# Patient Record
Sex: FEMALE | Race: ASIAN | NOT HISPANIC OR LATINO
[De-identification: names, ages, dates, MRNs, and addresses within clinical notes are randomized per-mention and may not be internally consistent; named-entity substitution may affect disease eponyms.]

---

## 2022-05-31 PROBLEM — Z00.00 ENCOUNTER FOR PREVENTIVE HEALTH EXAMINATION: Status: ACTIVE | Noted: 2022-05-31

## 2022-06-09 ENCOUNTER — APPOINTMENT (OUTPATIENT)
Dept: THORACIC SURGERY | Facility: CLINIC | Age: 72
End: 2022-06-09
Payer: MEDICARE

## 2022-06-09 VITALS
HEIGHT: 65 IN | TEMPERATURE: 97.2 F | RESPIRATION RATE: 16 BRPM | SYSTOLIC BLOOD PRESSURE: 127 MMHG | DIASTOLIC BLOOD PRESSURE: 70 MMHG | BODY MASS INDEX: 20.99 KG/M2 | HEART RATE: 84 BPM | OXYGEN SATURATION: 95 % | WEIGHT: 126 LBS

## 2022-06-09 DIAGNOSIS — Z86.39 PERSONAL HISTORY OF OTHER ENDOCRINE, NUTRITIONAL AND METABOLIC DISEASE: ICD-10-CM

## 2022-06-09 PROCEDURE — 99205 OFFICE O/P NEW HI 60 MIN: CPT

## 2022-06-10 PROBLEM — Z86.39 HISTORY OF HYPERLIPIDEMIA: Status: RESOLVED | Noted: 2022-06-10 | Resolved: 2022-06-10

## 2022-06-10 RX ORDER — ATORVASTATIN CALCIUM 80 MG/1
TABLET, FILM COATED ORAL
Refills: 0 | Status: ACTIVE | COMMUNITY

## 2022-06-10 NOTE — PHYSICAL EXAM
[Fully active, able to carry on all pre-disease performance without restriction] : Status 0 - Fully active, able to carry on all pre-disease performance without restriction [General Appearance - Alert] : alert [General Appearance - In No Acute Distress] : in no acute distress [Sclera] : the sclera and conjunctiva were normal [PERRL With Normal Accommodation] : pupils were equal in size, round, and reactive to light [Extraocular Movements] : extraocular movements were intact [Outer Ear] : the ears and nose were normal in appearance [Oropharynx] : the oropharynx was normal [Neck Appearance] : the appearance of the neck was normal [Neck Cervical Mass (___cm)] : no neck mass was observed [Jugular Venous Distention Increased] : there was no jugular-venous distention [Thyroid Diffuse Enlargement] : the thyroid was not enlarged [Thyroid Nodule] : there were no palpable thyroid nodules [Auscultation Breath Sounds / Voice Sounds] : lungs were clear to auscultation bilaterally [Heart Rate And Rhythm] : heart rate was normal and rhythm regular [Heart Sounds Gallop] : no gallops [Heart Sounds] : normal S1 and S2 [Murmurs] : no murmurs [Heart Sounds Pericardial Friction Rub] : no pericardial rub [Examination Of The Chest] : the chest was normal in appearance [Chest Visual Inspection Thoracic Asymmetry] : no chest asymmetry [Diminished Respiratory Excursion] : normal chest expansion [2+] : left 2+ [Breast Appearance] : normal in appearance [Breast Palpation Mass] : no palpable masses [Bowel Sounds] : normal bowel sounds [Abdomen Soft] : soft [Abdomen Tenderness] : non-tender [Abdomen Mass (___ Cm)] : no abdominal mass palpated [Cervical Lymph Nodes Enlarged Posterior Bilaterally] : posterior cervical [Cervical Lymph Nodes Enlarged Anterior Bilaterally] : anterior cervical [Supraclavicular Lymph Nodes Enlarged Bilaterally] : supraclavicular [No CVA Tenderness] : no ~M costovertebral angle tenderness [No Spinal Tenderness] : no spinal tenderness [Abnormal Walk] : normal gait [Nail Clubbing] : no clubbing  or cyanosis of the fingernails [Musculoskeletal - Swelling] : no joint swelling seen [Motor Tone] : muscle strength and tone were normal [Skin Color & Pigmentation] : normal skin color and pigmentation [Skin Turgor] : normal skin turgor [] : no rash [Deep Tendon Reflexes (DTR)] : deep tendon reflexes were 2+ and symmetric [Sensation] : the sensory exam was normal to light touch and pinprick [No Focal Deficits] : no focal deficits [Impaired Insight] : insight and judgment were intact [Oriented To Time, Place, And Person] : oriented to person, place, and time [Affect] : the affect was normal

## 2022-06-12 NOTE — CONSULT LETTER
[Dear  ___] : Dear  [unfilled], [Consult Letter:] : I had the pleasure of evaluating your patient, [unfilled]. [( Thank you for referring [unfilled] for consultation for _____ )] : Thank you for referring [unfilled] for consultation for [unfilled] [Please see my note below.] : Please see my note below. [Consult Closing:] : Thank you very much for allowing me to participate in the care of this patient.  If you have any questions, please do not hesitate to contact me. [Sincerely,] : Sincerely, [DrKathleen  ___] : Dr. PADILLA [FreeTextEntry2] : Dr. Nae Camargo (ID/Referring)\par Dr. Julien Michel (PCP) [FreeTextEntry3] : Constantino Chavarria MD, MPH \par System Director of Thoracic Surgery \par Director of Comprehensive Lung and Foregut [place] \par Professor Cardiovascular & Thoracic Surgery  \par [facility]\par \par [facility]\par [address]\par Oncology [address]\[place] [address]\par Tel: [phone]\par Fax: [phone]\par

## 2022-06-12 NOTE — ASSESSMENT
[FreeTextEntry1] : Ms. MICHELLE ELI, 71 year old female, never smoker, w/ hx of HLD, chronic lung nodules, referred by ID Dr. 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 CT scan reviewed, I recommended a PET/CT for further evaluation\par 2. PFTs with Dr. Justus Otto\par 3. RTC after all of above to discuss next step.\par \par \par I personally performed the services described in the documentation, reviewed the documentation recorded by the scribe in my presence and it accurately and completely records my words and actions.\par \par I, Zoila Leong NP, am scribing for and the presence of ROSALINDA Whitlock, the following sections HISTORY OF PRESENT ILLNESS, PAST MEDICAL/FAMILY/SOCIAL HISTORY; REVIEW OF SYSTEMS; VITAL SIGNS; PHYSICAL EXAM; DISPOSITION.\par \par

## 2022-06-30 ENCOUNTER — APPOINTMENT (OUTPATIENT)
Dept: THORACIC SURGERY | Facility: CLINIC | Age: 72
End: 2022-06-30

## 2022-06-30 VITALS
RESPIRATION RATE: 18 BRPM | SYSTOLIC BLOOD PRESSURE: 144 MMHG | BODY MASS INDEX: 20.63 KG/M2 | WEIGHT: 124 LBS | OXYGEN SATURATION: 95 % | DIASTOLIC BLOOD PRESSURE: 76 MMHG | HEART RATE: 94 BPM | TEMPERATURE: 98.2 F

## 2022-06-30 PROCEDURE — 99214 OFFICE O/P EST MOD 30 MIN: CPT

## 2022-07-01 RX ORDER — OXYBUTYNIN CHLORIDE 5 MG/1
5 TABLET ORAL
Qty: 30 | Refills: 0 | Status: ACTIVE | COMMUNITY
Start: 2022-05-27

## 2022-07-01 RX ORDER — DENOSUMAB 60 MG/ML
60 INJECTION SUBCUTANEOUS
Qty: 1 | Refills: 0 | Status: ACTIVE | COMMUNITY
Start: 2022-05-31

## 2022-07-01 RX ORDER — MUPIROCIN 2 G/100G
2 CREAM TOPICAL
Qty: 30 | Refills: 0 | Status: ACTIVE | COMMUNITY
Start: 2022-02-22

## 2022-07-01 RX ORDER — AZELASTINE HYDROCHLORIDE 0.5 MG/ML
0.05 SOLUTION/ DROPS OPHTHALMIC
Qty: 6 | Refills: 0 | Status: ACTIVE | COMMUNITY
Start: 2022-05-27

## 2022-07-01 RX ORDER — DICLOFENAC EPOLAMINE 0.01 G/1
1.3 SYSTEM TOPICAL
Qty: 60 | Refills: 0 | Status: ACTIVE | COMMUNITY
Start: 2022-05-27

## 2022-07-01 RX ORDER — LOPERAMIDE HYDROCHLORIDE 2 MG/1
2 CAPSULE ORAL
Qty: 20 | Refills: 0 | Status: ACTIVE | COMMUNITY
Start: 2022-06-25

## 2022-07-01 RX ORDER — TRAZODONE HYDROCHLORIDE 50 MG/1
50 TABLET ORAL
Qty: 30 | Refills: 0 | Status: ACTIVE | COMMUNITY
Start: 2022-05-27

## 2022-07-01 RX ORDER — OMEGA-3-ACID ETHYL ESTERS CAPSULES 1 G/1
1 CAPSULE, LIQUID FILLED ORAL
Qty: 120 | Refills: 0 | Status: ACTIVE | COMMUNITY
Start: 2022-02-22

## 2022-07-03 NOTE — CONSULT LETTER
[Dear  ___] : Dear  [unfilled], [Consult Letter:] : I had the pleasure of evaluating your patient, [unfilled]. [( Thank you for referring [unfilled] for consultation for _____ )] : Thank you for referring [unfilled] for consultation for [unfilled] [Please see my note below.] : Please see my note below. [Consult Closing:] : Thank you very much for allowing me to participate in the care of this patient.  If you have any questions, please do not hesitate to contact me. [Sincerely,] : Sincerely, [DrKathleen  ___] : Dr. PADILLA [FreeTextEntry2] : Dr. Nae Camargo (ID/Referring)\par Dr. Julien Michel (PCP) [FreeTextEntry3] : Constantino Chavarria MD, MPH \par System Director of Thoracic Surgery \par Director of Comprehensive Lung and Foregut [place] \par Professor Cardiovascular & Thoracic Surgery  \par [facility]\par \par [facility]\par [address]\par Oncology [address]\[place] [address]\par Tel: [phone]\par Fax: [phone]\par

## 2022-07-03 NOTE — DATA REVIEWED
[FreeTextEntry1] : I have independently reviewed the following:\par PET/CT on 6/23/22\par PFTs on 6/27/22

## 2022-07-03 NOTE — HISTORY OF PRESENT ILLNESS
[FreeTextEntry1] : Ms. MICHELLE ELI, 71 year old female, never smoker, w/ hx of HLD, chronic lung nodules (followed by Dr. Petar Rodriguez), who presented to PCP for routine f/u, she reported that she stopped seeing Dr. Rodriguez, so PCP had ordered her annual CT chest. Patient was referred to ID Dr. Nae Camargo to r/o T.B. 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.\par \par PET/CT on 6/23/22:\par - active Lt cervical chain LNs measuring up to SUV max 4.4\par - active Lt supraclavicular LNs measuring up to SUV max 6.2\par - 1cm SUV=2.6 RUL nodule (image 44)\par - 1.1cm SUV=3.7 RUL nodule (image 50)\par - foci of groundglass airspace disease throughout both lungs measuring up to SUV max 8.3 in the RLL (images 44-65)\par - foci of active subpleural airspace disease in the middle lobe and lingula anterior (image 66) measuring up to SUV max 4.7\par - subpleural airspace disease at both lung bases measuring up to SUV max 4.6\par \par PFTs on 6/27/22: FVC 66%, FEV1 67%.\par  \par Patient is here today for a follow up. Admits to dry cough, sometimes w/ blood-tinged sputum.\par \par

## 2022-07-03 NOTE — ASSESSMENT
[FreeTextEntry1] : Ms. MICHELLE ELI, 71 year old female, never smoker, w/ hx of HLD, chronic lung nodules, referred by ID Dr. 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.\par \par PET/CT on 6/23/22:\par - active Lt cervical chain LNs measuring up to SUV max 4.4\par - active Lt supraclavicular LNs measuring up to SUV max 6.2\par - 1cm SUV=2.6 RUL nodule (image 44)\par - 1.1cm SUV=3.7 RUL nodule (image 50)\par - foci of groundglass airspace disease throughout both lungs measuring up to SUV max 8.3 in the RLL (images 44-65)\par - foci of active subpleural airspace disease in the middle lobe and lingula anterior (image 66) measuring up to SUV max 4.7\par - subpleural airspace disease at both lung bases measuring up to SUV max 4.6\par \par PFTs on 6/27/22: FVC 66%, FEV1 67%.\par \par I have reviewed the patient's medical records and diagnostic images at time of this office consultation and have made the following recommendation:\par 1. PET scan reviewed, findings highly suspicious for malignancy, I recommended surgical resection (risks and benefits and alternatives: FNA of Lt supraclavicular LN, explained to patient, all questions answered) but patient declined as of now, she prefers to repeat another CT Chest w/o contrast in 6 months time.\par 2. RTC in 6mo with CT scan.\par \par \par I personally performed the services described in the documentation, reviewed the documentation recorded by the scribe in my presence and it accurately and completely records my words and actions.\par \par I, Zoila Leong NP, am scribing for and the presence of ROSALINDA Whitlock, the following sections HISTORY OF PRESENT ILLNESS, PAST MEDICAL/FAMILY/SOCIAL HISTORY; REVIEW OF SYSTEMS; VITAL SIGNS; PHYSICAL EXAM; DISPOSITION.\par \par

## 2022-12-22 ENCOUNTER — APPOINTMENT (OUTPATIENT)
Dept: THORACIC SURGERY | Facility: CLINIC | Age: 72
End: 2022-12-22

## 2022-12-22 VITALS
SYSTOLIC BLOOD PRESSURE: 132 MMHG | TEMPERATURE: 97.8 F | RESPIRATION RATE: 18 BRPM | DIASTOLIC BLOOD PRESSURE: 74 MMHG | WEIGHT: 121 LBS | HEART RATE: 88 BPM | OXYGEN SATURATION: 98 % | BODY MASS INDEX: 20.14 KG/M2

## 2022-12-22 PROCEDURE — 99214 OFFICE O/P EST MOD 30 MIN: CPT

## 2022-12-23 NOTE — ASSESSMENT
[FreeTextEntry1] : Ms. MICHELLE ELI, 72 year old female, never smoker, w/ hx of HLD, chronic lung nodules (followed by Dr. Petar Rodriguez), who presented to PCP for routine f/u, she reported that she stopped seeing Dr. Rodriguez, so PCP had ordered her annual CT chest. Patient was referred to ID Dr. Nae Camargo to r/o T.B. based on CT findings.\par \par CT Chest on 12/16/22:\par - 8 mm ovoid well-defined nodule (3:24)\par - 7 mm solid nodule RUL, was 9 mm\par - focal branching opacity in superior segment of RLL, likely inflammatory\par \par I have reviewed the patient's medical records and diagnostic images at time of this office consultation and have made the following recommendation:\par 1. CT scan showed stable and decreasing size nodules, recommended patient to return to office in 6mo w/ CT Chest w/o contrast.\par \par \par I, Dr. HAZEL, ROSALINDA MENDEZ, personally performed the evaluation and management (E/M) services for this established patient who presents today with (a) new problem(s)/exacerbation of (an) existing condition(s). That E/M includes conducting the examination, assessing all new/exacerbated conditions, and establishing a new plan of care. Today, my ACP, Zoila Leong NP was here to observe my evaluation and management services for this new problem/exacerbated condition to be followed going forward.\par \par \par

## 2022-12-23 NOTE — HISTORY OF PRESENT ILLNESS
[FreeTextEntry1] : Ms. MICHELLE ELI, 72 year old female, never smoker, w/ hx of HLD, chronic lung nodules (followed by Dr. Petar Rodriguez), who presented to PCP for routine f/u, she reported that she stopped seeing Dr. Rodriguez, so PCP had ordered her annual CT chest. Patient was referred to ID Dr. Nae Camargo to r/o T.B. 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.\par \par PET/CT on 6/23/22:\par - active Lt cervical chain LNs measuring up to SUV max 4.4\par - active Lt supraclavicular LNs measuring up to SUV max 6.2\par - 1cm SUV=2.6 RUL nodule (image 44)\par - 1.1cm SUV=3.7 RUL nodule (image 50)\par - foci of groundglass airspace disease throughout both lungs measuring up to SUV max 8.3 in the RLL (images 44-65)\par - foci of active subpleural airspace disease in the middle lobe and lingula anterior (image 66) measuring up to SUV max 4.7\par - subpleural airspace disease at both lung bases measuring up to SUV max 4.6\par \par PFTs on 6/27/22: FVC 66%, FEV1 67%.\par \par CT Chest on 12/16/22:\par - 8 mm ovoid well-defined nodule (3:24)\par - 7 mm solid nodule RUL, was 9 mm\par - focal branching opacity in superior segment of RLL, likely inflammatory\par \par Pt presents today for follow up.  Denies SOB, CP, cough.\par

## 2022-12-23 NOTE — CONSULT LETTER
[Dear  ___] : Dear  [unfilled], [Consult Letter:] : I had the pleasure of evaluating your patient, [unfilled]. [( Thank you for referring [unfilled] for consultation for _____ )] : Thank you for referring [unfilled] for consultation for [unfilled] [Please see my note below.] : Please see my note below. [Consult Closing:] : Thank you very much for allowing me to participate in the care of this patient.  If you have any questions, please do not hesitate to contact me. [Sincerely,] : Sincerely, [DrKathleen  ___] : Dr. PADILLA [FreeTextEntry2] : Dr. Nae Camargo (ID/Referring)\par Dr. Julien Michel (PCP) [FreeTextEntry3] : Constantino Chavarria MD, MPH \par System Director of Thoracic Surgery \par Director of Comprehensive Lung and Foregut [place] \par Professor Cardiovascular & Thoracic Surgery  \par [facility] at [facility]\par \par [facility]\par [address]\par Oncology [address]\[place] [address]\par Tel: [phone]\par Fax: [phone]\par

## 2023-06-22 ENCOUNTER — APPOINTMENT (OUTPATIENT)
Dept: THORACIC SURGERY | Facility: CLINIC | Age: 73
End: 2023-06-22
Payer: MEDICARE

## 2023-06-22 VITALS
DIASTOLIC BLOOD PRESSURE: 69 MMHG | BODY MASS INDEX: 20.65 KG/M2 | HEART RATE: 98 BPM | OXYGEN SATURATION: 95 % | SYSTOLIC BLOOD PRESSURE: 135 MMHG | TEMPERATURE: 97.6 F | RESPIRATION RATE: 18 BRPM | WEIGHT: 124.1 LBS

## 2023-06-22 PROCEDURE — 99214 OFFICE O/P EST MOD 30 MIN: CPT

## 2023-06-25 NOTE — CONSULT LETTER
[Dear  ___] : Dear  [unfilled], [Consult Letter:] : I had the pleasure of evaluating your patient, [unfilled]. [( Thank you for referring [unfilled] for consultation for _____ )] : Thank you for referring [unfilled] for consultation for [unfilled] [Please see my note below.] : Please see my note below. [Consult Closing:] : Thank you very much for allowing me to participate in the care of this patient.  If you have any questions, please do not hesitate to contact me. [Sincerely,] : Sincerely, [DrKathleen  ___] : Dr. PADILLA [FreeTextEntry2] : Dr. Nae Camargo (ID/Referring)\par Dr. Julien Michel (PCP) [FreeTextEntry3] : Constantino Chavarria MD, MPH \par System Director of Thoracic Surgery \par Director of Comprehensive Lung and Foregut [place] \par Professor Cardiovascular & Thoracic Surgery  \par [facility]\par \par [facility]\par [address]\par Oncology [address]\[place] [address]\par Tel: [phone]\par Fax: [phone]\par

## 2023-06-25 NOTE — ASSESSMENT
[FreeTextEntry1] : Ms. MICHELLE ELI, 72 year old female, never smoker, w/ hx of HLD, chronic lung nodules (followed by Dr. Petar Rodriguez), who presented to PCP for routine f/u, she reported that she stopped seeing Dr. Rodriguez, so PCP had ordered her annual CT chest. Patient was referred to ID Dr. Nae Camargo to r/o T.B. based on CT findings.\par \par CT Chest on 06/16/23 (MSR):\par -  A nodule is visualized within the right upper lobe posterolaterally (series 3 image 49), measuring 8 x 5 mm, not significantly changed in size. \par - A nodule is visualized within the right lower lobe posterolaterally (series 3 image 70), measuring 7 mm, not significantly changed. \par - A nodule is seen within the right lower lobe laterally (series 3 image 78), measuring 5 mm, not significantly\par changed. \par - Mild to moderate degree of patchy bilateral scarring and/or subsegmental atelectasis is seen, not significantly changed. \par \par I have reviewed the patient's medical records and diagnostic images at time of this office consultation and have made the following recommendation:\par 1. CT reviewed, stable lung nodules. RTC in 1 yr with CT Chest w/o contrast.\par \par \par I, ROSALINDA Whitlock, personally performed the evaluation and management (E/M) services for this established patient who presents today with (a) new problem(s)/exacerbation of (an) existing condition(s).  That E/M includes conducting the examination, assessing all new/exacerbated conditions, and establishing a new plan of care.  Today, my ACP, Dang Hawkins NP was here to observe my evaluation and management services for this new problem/exacerbated condition to be followed going forward.\par \par \par \par \par

## 2023-06-25 NOTE — HISTORY OF PRESENT ILLNESS
[FreeTextEntry1] : Ms. MICHELLE ELI, 72 year old female, never smoker, w/ hx of HLD, chronic lung nodules (followed by Dr. Petar Rodriguez), who presented to PCP for routine f/u, she reported that she stopped seeing Dr. Rodriguez, so PCP had ordered her annual CT chest. Patient was referred to ID Dr. Nae Camargo to r/o T.B. 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.\par \par PET/CT on 6/23/22:\par - active Lt cervical chain LNs measuring up to SUV max 4.4\par - active Lt supraclavicular LNs measuring up to SUV max 6.2\par - 1cm SUV=2.6 RUL nodule (image 44)\par - 1.1cm SUV=3.7 RUL nodule (image 50)\par - foci of groundglass airspace disease throughout both lungs measuring up to SUV max 8.3 in the RLL (images 44-65)\par - foci of active subpleural airspace disease in the middle lobe and lingula anterior (image 66) measuring up to SUV max 4.7\par - subpleural airspace disease at both lung bases measuring up to SUV max 4.6\par \par PFTs on 6/27/22: FVC 66%, FEV1 67%.\par \par CT Chest on 12/16/22:\par - 8 mm ovoid well-defined nodule (3:24)\par - 7 mm solid nodule RUL, was 9 mm\par - focal branching opacity in superior segment of RLL, likely inflammatory\par \par CT Chest on 06/16/23 (MSR):\par -  A nodule is visualized within the right upper lobe posterolaterally (series 3 image 49), measuring 8 x 5 mm, not significantly changed in size. \par - A nodule is visualized within the right lower lobe posterolaterally (series 3 image 70), measuring 7 mm, not significantly changed. \par - A nodule is seen within the right lower lobe laterally (series 3 image 78), measuring 5 mm, not significantly\par changed. \par - Mild to moderate degree of patchy bilateral scarring and/or subsegmental atelectasis is seen, not significantly changed. \par  \par Pt presents today for follow up.  Pt admits to having low platelet and WBC now, following Hem Dr. Dunaway.

## 2024-06-20 ENCOUNTER — NON-APPOINTMENT (OUTPATIENT)
Age: 74
End: 2024-06-20

## 2024-06-21 ENCOUNTER — APPOINTMENT (OUTPATIENT)
Dept: THORACIC SURGERY | Facility: CLINIC | Age: 74
End: 2024-06-21
Payer: MEDICARE

## 2024-06-21 DIAGNOSIS — R91.8 OTHER NONSPECIFIC ABNORMAL FINDING OF LUNG FIELD: ICD-10-CM

## 2024-06-21 PROCEDURE — 99443: CPT

## 2024-06-21 NOTE — CONSULT LETTER
[FreeTextEntry2] : Dr. Nae Camargo (ID/Referring)\par  Dr. Julien Michel (PCP) [FreeTextEntry3] : Constantino Chavarria MD, MPH \par  System Director of Thoracic Surgery \par  Director of Comprehensive Lung and Foregut [place] \par  Professor Cardiovascular & Thoracic Surgery  \par  [facility]\par  \par  [facility]\par  [address]\par  Oncology [address]\[place] [address]\par  Tel: [phone]\par  Fax: [phone]\par   Detail Level: Generalized General Sunscreen Counseling: I recommended a broad spectrum sunscreen with a SPF of 30 or higher.  I explained that SPF 30 sunscreens block approximately 97 percent of the sun's harmful rays.  Sunscreens should be applied at least 15 minutes prior to expected sun exposure and then every 2 hours after that as long as sun exposure continues. If swimming or exercising sunscreen should be reapplied every 45 minutes to an hour after getting wet or sweating.  One ounce, or the equivalent of a shot glass full of sunscreen, is adequate to protect the skin not covered by a bathing suit. I also recommended a lip balm with a sunscreen as well. Sun protective clothing can be used in lieu of sunscreen but must be worn the entire time you are exposed to the sun's rays.

## 2024-06-21 NOTE — ASSESSMENT
[FreeTextEntry1] : Ms. MICHELLE ELI, 73 year old female, never smoker, w/ hx of HLD, chronic lung nodules (followed by Dr. Petar Rodriguez), who presented to PCP for routine f/u, she reported that she stopped seeing Dr. Rodriguez, so PCP had ordered her annual CT chest. Patient was referred to ID Dr. Nae Camargo to r/o T.B. based on CT findings.  CT chest w/o contrast on 6/13/24 at [facility]: - Stable pulmonary nodules measuring up to 8 mm: stable 8 mm ovoid solid right upper lobe nodule (series 5, 79), stable 7 mm solid right upper lobe nodule (series 5, image 111), and stable 5 mm solid right lower lobe nodule (series 5, image 128). - Multifocal areas of mild to moderate subsegmental atelectasis/pulmonary scarring noted bilaterally. - No new or enlarging pulmonary nodule. - No evidence of pleural effusion or pleural-based mass.   I have reviewed the patient's medical records and diagnostic images at time of this office consultation and have made the following recommendation: 1. CT reviewed by Dr. Constantino Chavarria, stable nodules. RTC in 1 yr with CT Chest w/o contrast.

## 2024-06-21 NOTE — HISTORY OF PRESENT ILLNESS
[FreeTextEntry1] : Ms. MICHELLE ELI, 73 year old female, never smoker, w/ hx of HLD, chronic lung nodules (followed by Dr. Petar Rodriguez), who presented to PCP for routine f/u, she reported that she stopped seeing Dr. Rodriguez, so PCP had ordered her annual CT chest. Patient was referred to [facility] to r/o T.B. based on CT findings.  U/S Neck/soft tissue on 4/28/22: - 1.7 x 1 x 0.7cm hypoechoic nodule w/ a fatty hilum in the Lt mid neck  CT Chest on 5/2/22 (compared to CTs in 2021, 2020, 2018 and 2017): - slightly increasing size 1cm cavity in the RUL (3:26) - stable 1.1cm cavity in the RUL above the minor fissure but with a thicker wall, and the central cavitation has decreased in size (3:36) - a 1.3cm RLL nodule is less dense (3:50) - bilateral tree-in-bud nodules indicating ongoing small airway disease, likely due to T.B.  PET/CT on 6/23/22: - active Lt cervical chain LNs measuring up to SUV max 4.4 - active Lt supraclavicular LNs measuring up to SUV max 6.2 - 1cm SUV=2.6 RUL nodule (image 44) - 1.1cm SUV=3.7 RUL nodule (image 50) - foci of groundglass airspace disease throughout both lungs measuring up to SUV max 8.3 in the RLL (images 44-65) - foci of active subpleural airspace disease in the middle lobe and lingula anterior (image 66) measuring up to SUV max 4.7 - subpleural airspace disease at both lung bases measuring up to SUV max 4.6  PFTs on 6/27/22: FVC 66%, FEV1 67%.  CT Chest on 12/16/22: - 8 mm ovoid well-defined nodule (3:24) - 7 mm solid nodule RUL, was 9 mm - focal branching opacity in superior segment of RLL, likely inflammatory  CT Chest on 06/16/23 ([facility]): -  A nodule is visualized within the right upper lobe posterolaterally (series 3 image 49), measuring 8 x 5 mm, not significantly changed in size.  - A nodule is visualized within the right lower lobe posterolaterally (series 3 image 70), measuring 7 mm, not significantly changed.  - A nodule is seen within the right lower lobe laterally (series 3 image 78), measuring 5 mm, not significantly changed.  - Mild to moderate degree of patchy bilateral scarring and/or subsegmental atelectasis is seen, not significantly changed.   CT chest w/o contrast on 6/13/24 at [facility]: - Stable pulmonary nodules measuring up to 8 mm: stable 8 mm ovoid solid right upper lobe nodule (series 5, 79), stable 7 mm solid right upper lobe nodule (series 5, image 111), and stable 5 mm solid right lower lobe nodule (series 5, image 128). - Multifocal areas of mild to moderate subsegmental atelectasis/pulmonary scarring noted bilaterally. - No new or enlarging pulmonary nodule. - No evidence of pleural effusion or pleural-based mass.   Pt today to follow up with telephonic.

## 2025-07-17 ENCOUNTER — APPOINTMENT (OUTPATIENT)
Dept: THORACIC SURGERY | Facility: CLINIC | Age: 75
End: 2025-07-17
Payer: MEDICARE

## 2025-07-17 PROCEDURE — 99213 OFFICE O/P EST LOW 20 MIN: CPT | Mod: 93
